# Patient Record
Sex: FEMALE | Race: WHITE
[De-identification: names, ages, dates, MRNs, and addresses within clinical notes are randomized per-mention and may not be internally consistent; named-entity substitution may affect disease eponyms.]

---

## 2018-11-12 ENCOUNTER — HOSPITAL ENCOUNTER (OUTPATIENT)
Dept: HOSPITAL 61 - KCIC MAMMO | Age: 63
Discharge: HOME | End: 2018-11-12
Attending: FAMILY MEDICINE
Payer: COMMERCIAL

## 2018-11-12 DIAGNOSIS — Z12.31: Primary | ICD-10-CM

## 2018-11-12 PROCEDURE — 77067 SCR MAMMO BI INCL CAD: CPT

## 2018-11-12 NOTE — [facility]
Bilateral digital screening mammograms:

 

Reason for examination: Routine screening.

 

Comparison is made to previous studies dated back to 11/5/2013.

 

Interpretation is made with the benefit of CAD.

 

The skin and nipples show no abnormalities. No abnormal lymph nodes are 

seen. The breast parenchyma is predominantly fatty. (Breast density: 

Category A.) There continues to be some focal asymmetric parenchyma at the

6:00 B position of the right breast which is unchanged. There are no new 

dominant masses, suspicious calcifications or architectural distortions.

 

Impression:

 

No evidence of malignancy. Recommend routine screening.

 

BI-RADS Category 2: Benign.

 

"Our facility is accredited by the [employer] 

Mammography Program."

 

This patient's information has been entered into a reminder system for the

patient to be notified with the results of her examination and a target 

date for the next mammogram.

 

Electronically signed by: Vanessa Ellington MD (11/12/2018 4:34 PM) [id number]

## 2019-08-08 ENCOUNTER — HOSPITAL ENCOUNTER (OUTPATIENT)
Dept: HOSPITAL 61 - KCIC US | Age: 64
Discharge: HOME | End: 2019-08-08
Attending: FAMILY MEDICINE
Payer: COMMERCIAL

## 2019-08-08 DIAGNOSIS — N88.8: Primary | ICD-10-CM

## 2019-08-08 PROCEDURE — 76856 US EXAM PELVIC COMPLETE: CPT

## 2019-08-08 PROCEDURE — 76830 TRANSVAGINAL US NON-OB: CPT

## 2019-08-08 NOTE — [facility]
EXAM: Pelvic sonogram.

 

HISTORY: Enlarged uterus.

 

TECHNIQUE: Transabdominal and transvaginal sonographic imaging of the 

pelvis was performed.

 

COMPARISON: None.

 

FINDINGS: The uterus measures 5.5 x 2.5 x 4.2 cm. The endometrial stripe 

measures 2.3 mm thickness. The ovaries are not seen. There is a cystic 

structure within the right adnexa containing echogenic material, measuring

7.3 x 6.3 x 6.3 cm. There may be adjacent hydrosalpinx or loculated fluid.

There are several calcifications within the cervix. There is a small 

cervical nabothian cyst.

 

IMPRESSION:

1. Large suspected complex cystic lesion within the right adnexa measuring

7.3 cm. This may be ovarian or paraovarian in etiology. There is 

suggestion of adjacent hydrosalpinx or loculated fluid. Given the 

postmenopausal status the patient, correlation with a CA-125 serum tumor 

marker level and surgical consultation is recommended. The the ovaries are

not seen.

2. Normal-sized uterus and thin endometrial stripe, appropriate for the 

postmenopausal status of the patient.

 

Electronically signed by: Nicole Small MD (8/8/2019 4:35 PM) [place] [id number]

## 2019-12-05 ENCOUNTER — HOSPITAL ENCOUNTER (OUTPATIENT)
Dept: HOSPITAL 61 - KCIC MAMMO | Age: 64
Discharge: HOME | End: 2019-12-05
Attending: FAMILY MEDICINE
Payer: COMMERCIAL

## 2019-12-05 DIAGNOSIS — Z12.31: Primary | ICD-10-CM

## 2019-12-05 PROCEDURE — 77067 SCR MAMMO BI INCL CAD: CPT

## 2019-12-05 NOTE — [facility]
Bilateral digital screening mammograms:

 

Reason for examination: Routine screening.

 

Comparison is made to previous studies dated 11/12/2018 and 12/27/2016.

 

Interpretation is made with the benefit of CAD.

 

The skin and nipples show no abnormalities. No abnormal lymph nodes are 

seen. The breast parenchyma is predominantly fatty. (Breast density: 

Category A.) There continues to be some focal asymmetric parenchyma at the

6:00 B position of the right breast which is stable. There are no new 

dominant masses, suspicious calcifications or architectural distortions.

 

Impression:

 

No evidence of malignancy. Recommend routine screening.

 

BI-RADS Category 2: Benign.

 

"Our facility is accredited by the [employer] 

Mammography Program."

 

This patient's information has been entered into a reminder system for the

patient to be notified with the results of her examination and a target 

date for the next mammogram.

 

Electronically signed by: Vanessa Ellington MD (12/5/2019 6:30 PM) [facility]

## 2021-04-06 ENCOUNTER — HOSPITAL ENCOUNTER (OUTPATIENT)
Dept: HOSPITAL 61 - KCIC MAMMO | Age: 66
End: 2021-04-06
Attending: FAMILY MEDICINE
Payer: COMMERCIAL

## 2021-04-06 DIAGNOSIS — N64.89: ICD-10-CM

## 2021-04-06 DIAGNOSIS — Z12.31: Primary | ICD-10-CM

## 2021-04-06 PROCEDURE — 77067 SCR MAMMO BI INCL CAD: CPT

## 2021-04-06 NOTE — [facility]
Bilateral digital screening mammograms:



Reason for examination: Routine screening.



Comparison is made to previous studies dated back to 12/27/2016.



Interpretation is made with the benefit of CAD.



The skin and nipples show no abnormalities. No abnormal lymph nodes are seen. The breast parenchyma i
s predominantly fatty. (Breast density: Category A.) There continues to be some nodular asymmetry at 
the 6:00 B position of the right breast which is stable. There are no new dominant masses, suspicious
 calcifications or architectural distortions.



Impression:



No evidence of malignancy. Recommend routine screening.



BI-RADS Category 2: Benign.



"Our facility is accredited by the [employer] Mammography Program."



This patient's information has been entered into a reminder system for the patient to be notified wit
h the results of her examination and a target date for the next mammogram.



Electronically signed by: Vanessa Ellington MD (4/6/2021 4:28 PM) [id number]

## 2021-12-15 ENCOUNTER — HOSPITAL ENCOUNTER (OUTPATIENT)
Dept: HOSPITAL 61 - KCIC MRI | Age: 66
End: 2021-12-15
Attending: ORTHOPAEDIC SURGERY
Payer: COMMERCIAL

## 2021-12-15 DIAGNOSIS — S83.412A: Primary | ICD-10-CM

## 2021-12-15 DIAGNOSIS — M25.462: ICD-10-CM
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
## 2021-12-15 DIAGNOSIS — M25.862: ICD-10-CM
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
## 2021-12-15 DIAGNOSIS — S80.02XA: ICD-10-CM

## 2021-12-15 DIAGNOSIS — R60.0: ICD-10-CM
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
## 2021-12-15 PROCEDURE — 73721 MRI JNT OF LWR EXTRE W/O DYE: CPT

## 2021-12-15 NOTE — [facility]
EXAMINATION:  MRI LEFT LOWER EXTREMITY JOINT WITHOUT



INDICATIONS:  Medial left knee pain, fell on stairs over 2 weeks ago. Evaluate for meniscal tear..



TECHNIQUE:  Multiplanar multisequence MRI of the left knee was obtained without contrast.



COMPARISON: None



FINDINGS:  



MENISCI:  Mild intrasubstance signal in the posterior horn medial meniscus without definite extension
 to the surface is likely due to mucoid degeneration. There is no medial or lateral meniscal tear vis
ualized.



LIGAMENTS: The ACL and PCL are intact. Mild irregularity of the superficial MCL with fluid extending 
along the ligament suspicious for sprain/low-grade partial tear, greatest proximally. The medial meni
scofemoral ligament is not well visualized. Minimal increased signal in the distal biceps femoris and
 conjoined tendons may be minimal sprain. Popliteus tendon and iliotibial band are intact.



EXTENSOR MECHANISM:  The quadriceps and patellar tendons are intact. Fat pads are normal. Retinacula 
are intact.



BONES AND CARTILAGE:  Tiny impaction fracture with associated marrow edema in the posterior nonweight
bearing lateral femoral condyle. Marrow edema in the posterior lateral tibial plateau. There is scatt
ered superficial partial-thickness cartilage loss along the patella with a few deep fissures. Focal n
ear full-thickness cartilage defect in the inferior trochlear groove measuring 6 mm with subchondral 
marrow edema. Mild superficial partial-thickness cartilage loss in the medial compartment. Lateral co
mpartment cartilage is intact.



OTHER:  There is a small joint effusion with synovitis. No bursitis. Muscles are normal. Mild subcuta
neous edema, greatest medially.



IMPRESSION:  

1. No meniscal tear.

2. Sprain of the MCL and minimal sprain of the LCL complex.

3. Tiny impaction fracture of the posterior nonweightbearing lateral femoral condyle and contusion of
 the posterior lateral tibial plateau..

4. Medial and patellofemoral compartment cartilage loss.







Electronically signed by: Hodan Heller MD (12/15/2021 12:49 PM) [id number]